# Patient Record
Sex: MALE | Race: WHITE | NOT HISPANIC OR LATINO | Employment: FULL TIME | URBAN - METROPOLITAN AREA
[De-identification: names, ages, dates, MRNs, and addresses within clinical notes are randomized per-mention and may not be internally consistent; named-entity substitution may affect disease eponyms.]

---

## 2018-03-10 ENCOUNTER — HOSPITAL ENCOUNTER (EMERGENCY)
Facility: HOSPITAL | Age: 28
Discharge: HOME | End: 2018-03-10
Attending: EMERGENCY MEDICINE

## 2018-03-10 VITALS
WEIGHT: 210 LBS | HEIGHT: 70 IN | BODY MASS INDEX: 30.06 KG/M2 | TEMPERATURE: 98 F | HEART RATE: 74 BPM | OXYGEN SATURATION: 98 % | DIASTOLIC BLOOD PRESSURE: 60 MMHG | RESPIRATION RATE: 18 BRPM | SYSTOLIC BLOOD PRESSURE: 125 MMHG

## 2018-03-10 DIAGNOSIS — R41.82 ALTERED MENTAL STATUS: ICD-10-CM

## 2018-03-10 DIAGNOSIS — F10.920 ALCOHOLIC INTOXICATION WITHOUT COMPLICATION: Primary | ICD-10-CM

## 2018-03-10 LAB
AMPHET+METHAMPHET UR QL: NEGATIVE
ANION GAP SERPL CALC-SCNC: 11 MMOL/L
BARBITURATES UR QL SCN>200 NG/ML: NEGATIVE
BENZODIAZ UR QL SCN>200 NG/ML: NEGATIVE
BUN SERPL-MCNC: 11 MG/DL
BZE UR QL SCN: NEGATIVE
CALCIUM SERPL-MCNC: 8.7 MG/DL
CANNABINOIDS UR QL SCN: NEGATIVE
CHLORIDE SERPL-SCNC: 113 MMOL/L
CO2 SERPL-SCNC: 24 MMOL/L
CREAT SERPL-MCNC: 1.2 MG/DL
CREAT UR-MCNC: 101.2 MG/DL
ERYTHROCYTE [DISTWIDTH] IN BLOOD BY AUTOMATED COUNT: 12.4 %
EST. GFR  (AFRICAN AMERICAN): >60 ML/MIN/1.73 M^2
EST. GFR  (NON AFRICAN AMERICAN): >60 ML/MIN/1.73 M^2
ETHANOL SERPL-MCNC: 290 MG/DL
GLUCOSE SERPL-MCNC: 137 MG/DL
HCT VFR BLD AUTO: 46.1 %
HGB BLD-MCNC: 16 G/DL
MCH RBC QN AUTO: 30 PG
MCHC RBC AUTO-ENTMCNC: 34.7 G/DL
MCV RBC AUTO: 87 FL
METHADONE UR QL SCN>300 NG/ML: NEGATIVE
OPIATES UR QL SCN: NEGATIVE
PCP UR QL SCN>25 NG/ML: NEGATIVE
PLATELET # BLD AUTO: 180 K/UL
PMV BLD AUTO: 9.5 FL
POTASSIUM SERPL-SCNC: 3.6 MMOL/L
RBC # BLD AUTO: 5.33 M/UL
SODIUM SERPL-SCNC: 148 MMOL/L
TOXICOLOGY INFORMATION: NORMAL
WBC # BLD AUTO: 5.28 K/UL

## 2018-03-10 PROCEDURE — 80307 DRUG TEST PRSMV CHEM ANLYZR: CPT

## 2018-03-10 PROCEDURE — 25000003 PHARM REV CODE 250: Performed by: EMERGENCY MEDICINE

## 2018-03-10 PROCEDURE — 96361 HYDRATE IV INFUSION ADD-ON: CPT

## 2018-03-10 PROCEDURE — 93010 ELECTROCARDIOGRAM REPORT: CPT | Mod: ,,, | Performed by: INTERNAL MEDICINE

## 2018-03-10 PROCEDURE — 96360 HYDRATION IV INFUSION INIT: CPT

## 2018-03-10 PROCEDURE — 99284 EMERGENCY DEPT VISIT MOD MDM: CPT | Mod: 25

## 2018-03-10 PROCEDURE — 85027 COMPLETE CBC AUTOMATED: CPT

## 2018-03-10 PROCEDURE — 80320 DRUG SCREEN QUANTALCOHOLS: CPT

## 2018-03-10 PROCEDURE — 93005 ELECTROCARDIOGRAM TRACING: CPT

## 2018-03-10 PROCEDURE — 80048 BASIC METABOLIC PNL TOTAL CA: CPT

## 2018-03-10 RX ORDER — ASPIRIN 325 MG/1
100 TABLET, FILM COATED ORAL DAILY
Status: DISCONTINUED | OUTPATIENT
Start: 2018-03-10 | End: 2018-03-10 | Stop reason: HOSPADM

## 2018-03-10 RX ADMIN — Medication 100 MG: at 09:03

## 2018-03-10 RX ADMIN — SODIUM CHLORIDE 1000 ML: 0.9 INJECTION, SOLUTION INTRAVENOUS at 08:03

## 2018-03-10 RX ADMIN — SODIUM CHLORIDE 1000 ML: 0.9 INJECTION, SOLUTION INTRAVENOUS at 06:03

## 2018-03-10 NOTE — ED NOTES
Friend is now at bedside. Pt is lying comfortably in bed. Awake, alert, but not speaking at this time

## 2018-03-10 NOTE — ED NOTES
Patient identifiers for Edward Cordova checked and correct.  LOC: Eyes open will not answer any questions. Did state his name.  Does follow commands to squeeze my hands.  APPEARANCE: Pupils dilated. Patient resting comfortably.  SKIN: The skin is warm and dry, patient has normal skin turgor and moist mucus membranes.  MUSKULOSKELETAL: Patient moving all extremities well, no obvious swelling or deformities noted.  RESPIRATORY: Airway is open and patent, respirations are spontaneous, patient has a normal effort and rate.  NEUROLOGIC: PERRL, facial expression is symmetrical, bilateral hand grasp equal and even.

## 2018-03-10 NOTE — ED PROVIDER NOTES
Encounter Date: 3/10/2018    SCRIBE #1 NOTE: I, Rhonda Awan, am scribing for, and in the presence of, Dr. Treadwell.       History     Chief Complaint   Patient presents with    Drug Overdose     Pt to ED with complaints of drug overdose. Pt's friends called EMS after pt was unresponsive after supposedly drinking some martinis and other alcoholic beverages. Pt was found to be breathing 4 bpm on EMS arrival. states pt was given 2 of Narcan and was immediately responsive. pt has been uncooperative with stating hpi with current incident     Edward Cordova is a 28 y.o. male who  has no past medical history on file.     The patient presents to the ED due to drug overdose.  Per EMS, pt's friends called EMS after pt was unresponsive after drinking ETOH tonight.  Pt was found with heart rate of 4 BPM on EMS arrival.  EMS states pt was given 2 mg of Narcan and was immediately responsive.  Pt is not cooperative in answering questions but following commands during physical examination.  Pt denies any medical problems.  He states he doesn't remember anything tonight.  No other verbalized complaints at this time.        The history is provided by the patient and the EMS personnel.     Review of patient's allergies indicates:  Allergies not on file  No past medical history on file.  No past surgical history on file.  No family history on file.  Social History   Substance Use Topics    Smoking status: Not on file    Smokeless tobacco: Not on file    Alcohol use Not on file     Review of Systems   Unable to perform ROS: Patient nonverbal   Constitutional: Negative for fever.   HENT: Negative for sore throat.    Respiratory: Negative for shortness of breath.    Cardiovascular: Negative for chest pain.   Gastrointestinal: Negative for nausea.   Genitourinary: Negative for dysuria.   Musculoskeletal: Negative for back pain.   Skin: Negative for rash.   Neurological: Negative for weakness.   Hematological: Does not bruise/bleed  easily.       Physical Exam     Initial Vitals [03/10/18 0503]   BP Pulse Resp Temp SpO2   132/69 78 14 97.4 °F (36.3 °C) 100 %      MAP       90         Physical Exam    Nursing note and vitals reviewed.  Constitutional: He appears well-developed and well-nourished. No distress.   Pt is awake but nonverbal, follow commands but uncoopperative with HPI.     HENT:   Head: Normocephalic and atraumatic.   Mouth/Throat: Oropharynx is clear and moist.   Eyes: EOM are normal. Pupils are equal, round, and reactive to light.   Neck: Normal range of motion. Neck supple. No tracheal deviation present.   Cardiovascular: Normal rate, regular rhythm, normal heart sounds and intact distal pulses.   Pulmonary/Chest: Breath sounds normal. No stridor. No respiratory distress. He has no wheezes.   Abdominal: Soft. Bowel sounds are normal. He exhibits no distension and no mass. There is no tenderness.   Musculoskeletal: Normal range of motion. He exhibits no edema.   Neurological: He is alert and oriented to person, place, and time. He has normal strength. No cranial nerve deficit or sensory deficit.   Skin: Skin is warm and dry. No rash noted.         ED Course   Procedures  Labs Reviewed   BASIC METABOLIC PANEL - Abnormal; Notable for the following:        Result Value    Sodium 148 (*)     Chloride 113 (*)     Glucose 137 (*)     All other components within normal limits   ALCOHOL,MEDICAL (ETHANOL) - Abnormal; Notable for the following:     Alcohol, Medical, Serum 290 (*)     All other components within normal limits   CBC WITHOUT DIFFERENTIAL   DRUG SCREEN PANEL, URINE EMERGENCY     EKG Readings: (Independently Interpreted)   Rhythm: Normal Sinus Rhythm. Heart Rate: 81.   No ST changes.  No ischemia.            Medical Decision Making:   History:   Old Medical Records: I decided to obtain old medical records.  Independently Interpreted Test(s):   I have ordered and independently interpreted EKG Reading(s) - see prior  notes  Clinical Tests:   Lab Tests: Ordered and Reviewed  Medical Tests: Ordered and Reviewed  ED Management:  Pt turned over to Dr. Galvan at shift change at 0600.   0600: Patient evaluated by me, he remains sleepy and lethargic, not answering questions. He has horizontal nystagmus.  0700: Tox screen neg for drugs. EtOh level is 290. The paitent's friends came to the ED and the patient finally awakened. He admits to not drinking for the past 3 months but drank a lot last night.   0930: The patient is ambulatory and will be discharged. He will be observed by close family friends.                      Clinical Impression:   The primary encounter diagnosis was Alcoholic intoxication without complication. A diagnosis of Altered mental status was also pertinent to this visit.          I, Anamaria Galvan, personally performed the services described in this documentation. All medical record entries made by the scribe were at my direction and in my presence.  I have reviewed the chart and agree that the record reflects my personal performance and is accurate and complete. Anamaria Galvan M.D. 1:26 PM03/10/2018                 Anamaria Galvan MD  03/10/18 1325

## 2018-03-10 NOTE — ED NOTES
Pt is awake, alert, orientedx4. Denies complaints. States he feels ready to go home. Pt given paper scrub pants to replace his soiled pants

## 2018-03-10 NOTE — ED NOTES
Report received from SHANNON Scanlon. Pt is sleeping comfortably. Respirations unlabored. IV fluids are infusing to gravity. NAD noted.